# Patient Record
Sex: MALE | Race: WHITE | NOT HISPANIC OR LATINO | Employment: FULL TIME | URBAN - METROPOLITAN AREA
[De-identification: names, ages, dates, MRNs, and addresses within clinical notes are randomized per-mention and may not be internally consistent; named-entity substitution may affect disease eponyms.]

---

## 2024-03-12 ENCOUNTER — OFFICE VISIT (OUTPATIENT)
Dept: PRIMARY CARE | Facility: CLINIC | Age: 19
End: 2024-03-12
Payer: COMMERCIAL

## 2024-03-12 VITALS
HEIGHT: 68 IN | WEIGHT: 124 LBS | TEMPERATURE: 97.6 F | DIASTOLIC BLOOD PRESSURE: 72 MMHG | SYSTOLIC BLOOD PRESSURE: 110 MMHG | HEART RATE: 62 BPM | BODY MASS INDEX: 18.79 KG/M2 | OXYGEN SATURATION: 98 %

## 2024-03-12 DIAGNOSIS — K59.1 FUNCTIONAL DIARRHEA: ICD-10-CM

## 2024-03-12 DIAGNOSIS — Z00.00 ROUTINE GENERAL MEDICAL EXAMINATION AT A HEALTH CARE FACILITY: Primary | ICD-10-CM

## 2024-03-12 PROCEDURE — 1036F TOBACCO NON-USER: CPT | Performed by: INTERNAL MEDICINE

## 2024-03-12 PROCEDURE — 99385 PREV VISIT NEW AGE 18-39: CPT | Performed by: INTERNAL MEDICINE

## 2024-03-12 ASSESSMENT — PATIENT HEALTH QUESTIONNAIRE - PHQ9
SUM OF ALL RESPONSES TO PHQ9 QUESTIONS 1 AND 2: 0
1. LITTLE INTEREST OR PLEASURE IN DOING THINGS: NOT AT ALL
2. FEELING DOWN, DEPRESSED OR HOPELESS: NOT AT ALL

## 2024-03-12 ASSESSMENT — ENCOUNTER SYMPTOMS
DEPRESSION: 0
OCCASIONAL FEELINGS OF UNSTEADINESS: 0
LOSS OF SENSATION IN FEET: 0

## 2024-03-12 NOTE — PROGRESS NOTES
Subjective   Patient ID: Isauro Carroll is a 18 y.o. male who presents for Establish Care (New patient.  Establish care.  Has not been to a doctor in 3 years.  Digestive problems.  Stomach cramps and food goes right through him 15 minutes after eating.).  HPI  Patient is here for annual check-up    Last well check  new to doc     Reported health here for new to doc    Has been having  loose watery stool and occ solid w cramps and pain after meals 3 to 4 times a day'   No blood or mucus     Healthy eater    Has been small for what he eats     Dental  check  not in a long time no teeth issues      Vision check reg   Vision issues glasses   Hearing issues n   Vaccines UTD  y    Diet healthy  ats a lot does not gain weight   Exercise started gym   Work  in factory doing assembly   Caffeine  2 monster a week  Alcohol on occassion  Tobacco   Vaping until 2 weeks ago   Colon cancer screening  never     Current issues: see above      Review of Systems  GENERAL - Denies fever, fatigue or chills  SKIN - Denies rash, new skin lesions, or change in moles  EYES - Denies blurred vision, or change in visual acuity  EARS - Denies ear pain, discharge, ringing, or difficulty hearing  NOSE - Denies nasal congestion, discharge, or bleeding  MOUTH - Denies sore throat, postnasal drip or painful/difficulty swallowing  NECK - Denies pain or swelling  RESPIRATORY - Denies shortness of breath, cough, wheezing  CARDIOVASCULAR - Denies palpitations, chest pain, orthopnea, peripheral edema, syncope or claudication  GASTROINTESTINAL - Denies nausea, vomiting, diarrhea, constipation, abdominal pain, melena and or bright red blood  GENITOURINARY - Denies dysuria, frequency of urination, urgency, or hesitancy  MUSCULOSKELETAL - Denies joint or muscle pain, or back pain  NEUROLOGICAL - Denies localized numbness, weakness, or tingling  PSYCHIATRIC - Denies depression, anxiety, substance abuse, suicidal or homicidal ideation  ENDOCRINE - Denies heat  "or cold intolerance, weight loss or gain, increasing thirst  HEMATO-IMMUNOLOGIC - Denies easy bruising, bleeding, oral ulcerations or recurrent infections   Objective   /72   Pulse 62   Temp 36.4 °C (97.6 °F) (Oral)   Ht 1.727 m (5' 8\")   Wt 56.2 kg (124 lb)   SpO2 98%   BMI 18.85 kg/m²    Physical Exam  CONSTITUTIONAL - well nourished, well developed, looks like stated age, in no acute distress, not ill-appearing, and not tired appearing  SKIN - normal skin color and pigmentation, normal skin turgor without rash, lesions, or nodules visualized  HEAD - no trauma, normocephalic  EYES -normal bilaterally fundi normal extraocular movements are intact  ENT - TM's intact, no injection, no exudate,  nasal passage without discharge and patent  NECK - supple without rigidity, no neck mass was observed, no thyromegaly or thyroid nodules  CHEST - clear to auscultation, no wheezing, no crackles and no rales, good effort  CARDIAC - regular rate and regular rhythm, no skipped beats, no murmur  ABDOMEN - no organomegaly, soft, nontender, nondistended, normal bowel sounds, no guarding/rebound/rigidity  EXTREMITIES - no edema, no deformities  NEUROLOGICAL -cranial nerves II through XII are intact nonfocal  PSYCHIATRIC - alert, pleasant and cordial, age-appropriate  LYMPHATIC- no cervical lymphadenopathy    Assessment/Plan   Diagnoses and all orders for this visit:  Routine general medical examination at a health care facility  -     Comprehensive Metabolic Panel; Future  -     CBC and Auto Differential; Future  -     TSH with reflex to Free T4 if abnormal; Future  -     Lipid Panel; Future  Functional diarrhea  -     Amylase; Future  -     Lipase; Future  -     C-Reactive Protein; Future  -     Referral to Gastroenterology; Future    Blood work ordered  Referred to GI  Follow-up with me after  Discussed healthy diet and exercise  Discussed healthy meals avoiding excessive caffeine  Updated health maintenance " topics    Yesi Ca MD

## 2024-03-16 ENCOUNTER — LAB (OUTPATIENT)
Dept: LAB | Facility: LAB | Age: 19
End: 2024-03-16
Payer: COMMERCIAL

## 2024-03-16 DIAGNOSIS — K59.1 FUNCTIONAL DIARRHEA: ICD-10-CM

## 2024-03-16 DIAGNOSIS — Z00.00 ROUTINE GENERAL MEDICAL EXAMINATION AT A HEALTH CARE FACILITY: ICD-10-CM

## 2024-03-16 PROCEDURE — 80053 COMPREHEN METABOLIC PANEL: CPT

## 2024-03-16 PROCEDURE — 36415 COLL VENOUS BLD VENIPUNCTURE: CPT

## 2024-03-16 PROCEDURE — 83690 ASSAY OF LIPASE: CPT

## 2024-03-16 PROCEDURE — 80061 LIPID PANEL: CPT

## 2024-03-16 PROCEDURE — 82150 ASSAY OF AMYLASE: CPT

## 2024-03-16 PROCEDURE — 84443 ASSAY THYROID STIM HORMONE: CPT

## 2024-03-16 PROCEDURE — 85025 COMPLETE CBC W/AUTO DIFF WBC: CPT

## 2024-03-16 PROCEDURE — 86140 C-REACTIVE PROTEIN: CPT

## 2024-03-17 LAB
ALBUMIN SERPL BCP-MCNC: 4.5 G/DL (ref 3.4–5)
ALP SERPL-CCNC: 125 U/L (ref 33–120)
ALT SERPL W P-5'-P-CCNC: 24 U/L (ref 10–52)
AMYLASE SERPL-CCNC: 40 U/L (ref 29–103)
ANION GAP SERPL CALC-SCNC: 13 MMOL/L (ref 10–20)
AST SERPL W P-5'-P-CCNC: 25 U/L (ref 9–39)
BASOPHILS # BLD AUTO: 0.06 X10*3/UL (ref 0–0.1)
BASOPHILS NFR BLD AUTO: 1.2 %
BILIRUB SERPL-MCNC: 1.3 MG/DL (ref 0–1.2)
BUN SERPL-MCNC: 16 MG/DL (ref 6–23)
CALCIUM SERPL-MCNC: 9.5 MG/DL (ref 8.6–10.6)
CHLORIDE SERPL-SCNC: 103 MMOL/L (ref 98–107)
CHOLEST SERPL-MCNC: 149 MG/DL (ref 0–199)
CHOLESTEROL/HDL RATIO: 3.5
CO2 SERPL-SCNC: 27 MMOL/L (ref 21–32)
CREAT SERPL-MCNC: 0.99 MG/DL (ref 0.5–1.3)
CRP SERPL-MCNC: <0.1 MG/DL
EGFRCR SERPLBLD CKD-EPI 2021: >90 ML/MIN/1.73M*2
EOSINOPHIL # BLD AUTO: 0.06 X10*3/UL (ref 0–0.7)
EOSINOPHIL NFR BLD AUTO: 1.2 %
ERYTHROCYTE [DISTWIDTH] IN BLOOD BY AUTOMATED COUNT: 12.7 % (ref 11.5–14.5)
GLUCOSE SERPL-MCNC: 78 MG/DL (ref 74–99)
HCT VFR BLD AUTO: 41.7 % (ref 41–52)
HDLC SERPL-MCNC: 42.9 MG/DL
HGB BLD-MCNC: 14.9 G/DL (ref 13.5–17.5)
IMM GRANULOCYTES # BLD AUTO: 0.01 X10*3/UL (ref 0–0.7)
IMM GRANULOCYTES NFR BLD AUTO: 0.2 % (ref 0–0.9)
LDLC SERPL CALC-MCNC: 93 MG/DL
LIPASE SERPL-CCNC: 19 U/L (ref 9–82)
LYMPHOCYTES # BLD AUTO: 1.96 X10*3/UL (ref 1.2–4.8)
LYMPHOCYTES NFR BLD AUTO: 39.4 %
MCH RBC QN AUTO: 30.2 PG (ref 26–34)
MCHC RBC AUTO-ENTMCNC: 35.7 G/DL (ref 32–36)
MCV RBC AUTO: 84 FL (ref 80–100)
MONOCYTES # BLD AUTO: 0.43 X10*3/UL (ref 0.1–1)
MONOCYTES NFR BLD AUTO: 8.6 %
NEUTROPHILS # BLD AUTO: 2.46 X10*3/UL (ref 1.2–7.7)
NEUTROPHILS NFR BLD AUTO: 49.4 %
NON HDL CHOLESTEROL: 106 MG/DL (ref 0–119)
NRBC BLD-RTO: 0 /100 WBCS (ref 0–0)
PLATELET # BLD AUTO: 223 X10*3/UL (ref 150–450)
POTASSIUM SERPL-SCNC: 4 MMOL/L (ref 3.5–5.3)
PROT SERPL-MCNC: 6.6 G/DL (ref 6.4–8.2)
RBC # BLD AUTO: 4.94 X10*6/UL (ref 4.5–5.9)
SODIUM SERPL-SCNC: 139 MMOL/L (ref 136–145)
TRIGL SERPL-MCNC: 64 MG/DL (ref 0–149)
TSH SERPL-ACNC: 1.47 MIU/L (ref 0.44–3.98)
VLDL: 13 MG/DL (ref 0–40)
WBC # BLD AUTO: 5 X10*3/UL (ref 4.4–11.3)

## 2024-05-16 ENCOUNTER — HOSPITAL ENCOUNTER (OUTPATIENT)
Dept: RADIOLOGY | Facility: EXTERNAL LOCATION | Age: 19
Discharge: HOME | End: 2024-05-16
Payer: COMMERCIAL

## 2024-05-16 DIAGNOSIS — M25.512 ACUTE PAIN OF LEFT SHOULDER: ICD-10-CM

## 2024-10-08 ENCOUNTER — OFFICE VISIT (OUTPATIENT)
Dept: URGENT CARE | Age: 19
End: 2024-10-08
Payer: COMMERCIAL

## 2024-10-08 VITALS
RESPIRATION RATE: 20 BRPM | SYSTOLIC BLOOD PRESSURE: 134 MMHG | HEART RATE: 74 BPM | TEMPERATURE: 98 F | OXYGEN SATURATION: 98 % | DIASTOLIC BLOOD PRESSURE: 85 MMHG

## 2024-10-08 DIAGNOSIS — J02.9 SORE THROAT: ICD-10-CM

## 2024-10-08 DIAGNOSIS — J02.9 ACUTE PHARYNGITIS, UNSPECIFIED ETIOLOGY: Primary | ICD-10-CM

## 2024-10-08 LAB — POC RAPID STREP: NEGATIVE

## 2024-10-08 PROCEDURE — 87880 STREP A ASSAY W/OPTIC: CPT | Performed by: EMERGENCY MEDICINE

## 2024-10-08 PROCEDURE — 87651 STREP A DNA AMP PROBE: CPT

## 2024-10-08 PROCEDURE — 99213 OFFICE O/P EST LOW 20 MIN: CPT | Performed by: EMERGENCY MEDICINE

## 2024-10-08 ASSESSMENT — ENCOUNTER SYMPTOMS
SHORTNESS OF BREATH: 0
DYSURIA: 0
EYE REDNESS: 0
PALPITATIONS: 0
DIZZINESS: 0
ADENOPATHY: 0
BRUISES/BLEEDS EASILY: 0
CHEST TIGHTNESS: 0
SINUS PRESSURE: 0
NAUSEA: 0
FREQUENCY: 0
VOMITING: 0
EYE PAIN: 0
SINUS PAIN: 0
HEADACHES: 0
MYALGIAS: 0
FEVER: 1
ARTHRALGIAS: 0
CONSTIPATION: 0
SORE THROAT: 1
DIARRHEA: 0

## 2024-10-08 NOTE — PATIENT INSTRUCTIONS
Gargle with salt water three times a day for the next three days (please continue if you have started already).  You may find relief drinking warm liquids.  You may use over the counter medications for pain relief (Tylenol or ibuprofen per package instructions).  If you feel you are worsening (increased pain, not able to eat or drink, swelling in the back of the throat), please be re-evaluated (urgent care, family physician or emergency department

## 2024-10-08 NOTE — LETTER
October 8, 2024     Patient: Isauro Carroll   YOB: 2005   Date of Visit: 10/8/2024       To Whom It May Concern:    It is my medical opinion that Isauro Carroll may return to work on Thursday October 10, 2024 .    If you have any questions or concerns, please don't hesitate to call.         Sincerely,        Nena Contreras, DO    CC: No Recipients

## 2024-10-08 NOTE — PROGRESS NOTES
Subjective   Patient ID: Isauro Carroll is a 18 y.o. male. They present today with a chief complaint of Sore Throat and Fever (X 1 day).    History of Present Illness    Sore Throat   Pertinent negatives include no congestion, diarrhea, ear discharge, ear pain, headaches, shortness of breath or vomiting.   Fever   Associated symptoms include a sore throat. Pertinent negatives include no chest pain, congestion, diarrhea, ear pain, headaches, nausea, rash, urinary pain or vomiting.    18-year-old gentleman presents with a sore throat since yesterday.  States he also felt a bit febrile today while at work.  Mild headache.  Denies upper respiratory congestion.  Denies nausea vomiting or rash.  Denies chest pain or shortness of breath.  Occasional intermittent bilateral ear pain.  Concerned about strep.    Past Medical History  Allergies as of 10/08/2024    (No Known Allergies)       (Not in a hospital admission)       Past Medical History:   Diagnosis Date    Bitten or stung by nonvenomous insect and other nonvenomous arthropods, initial encounter 10/10/2016    Bug bite, initial encounter    Pneumonia, unspecified organism 11/12/2014    Atypical pneumonia    Tension-type headache, unspecified, not intractable 08/18/2014    Tension headache    Unspecified open wound, right lower leg, initial encounter     Wound of right leg       No past surgical history on file.     reports that he has quit smoking. His smoking use included cigarettes. He has never used smokeless tobacco. He reports current alcohol use. He reports that he does not use drugs.    Review of Systems  Review of Systems   Constitutional:  Positive for fever.   HENT:  Positive for sore throat. Negative for congestion, dental problem, ear discharge, ear pain, hearing loss, mouth sores, postnasal drip, sinus pressure, sinus pain and sneezing.    Eyes:  Negative for pain and redness.   Respiratory:  Negative for chest tightness and shortness of breath.     Cardiovascular:  Negative for chest pain and palpitations.   Gastrointestinal:  Negative for constipation, diarrhea, nausea and vomiting.   Endocrine: Negative for cold intolerance and heat intolerance.   Genitourinary:  Negative for dysuria and frequency.   Musculoskeletal:  Negative for arthralgias and myalgias.   Skin:  Negative for rash.   Neurological:  Negative for dizziness and headaches.   Hematological:  Negative for adenopathy. Does not bruise/bleed easily.                                  Objective    Vitals:    10/08/24 1737   BP: 134/85   BP Location: Right arm   Patient Position: Sitting   BP Cuff Size: Small adult   Pulse: 74   Resp: 20   Temp: 36.7 °C (98 °F)   TempSrc: Temporal   SpO2: 98%     No LMP for male patient.    Physical Exam  Vitals and nursing note reviewed.   Constitutional:       Appearance: Normal appearance.   HENT:      Head: Normocephalic and atraumatic.      Nose: Nose normal.      Mouth/Throat:      Mouth: Mucous membranes are moist.      Pharynx: Posterior oropharyngeal erythema present.      Comments: Palatal petechiae noted across the hard palate as well as behind the tonsillar region bilaterally.  Uvula midline.  Handling secretions.  Mucous membranes moist  Eyes:      Extraocular Movements: Extraocular movements intact.      Conjunctiva/sclera: Conjunctivae normal.      Pupils: Pupils are equal, round, and reactive to light.   Cardiovascular:      Rate and Rhythm: Normal rate and regular rhythm.   Pulmonary:      Effort: Pulmonary effort is normal.      Breath sounds: Normal breath sounds.   Skin:     General: Skin is warm and dry.   Neurological:      Mental Status: He is alert and oriented to person, place, and time.   Psychiatric:         Behavior: Behavior normal.         Procedures    Point of Care Test & Imaging Results from this visit  Results for orders placed or performed in visit on 10/08/24   POCT rapid strep A manually resulted   Result Value Ref Range    POC  Rapid Strep Negative Negative      No results found.    Diagnostic study results (if any) were reviewed by Nena Contreras DO.    Assessment/Plan   Allergies, medications, history, and pertinent labs/EKGs/Imaging reviewed by Nena Contreras DO.     Medical Decision Making  18-year-old male presents with sore throat and intermittent fever.  Rapid strep negative.  Culture sent due to high pretest probability.  Warm fluids salt water gargles Tylenol Motrin as needed until culture returns.  Treatment will be based upon culture result.  Work note given for tomorrow.  Follow-up with PCP    Orders and Diagnoses  Diagnoses and all orders for this visit:  Acute pharyngitis, unspecified etiology  -     Group A Streptococcus, PCR  Sore throat  -     POCT rapid strep A manually resulted      Medical Admin Record      Patient disposition: Home    Electronically signed by Nena Contreras DO  5:54 PM

## 2024-10-09 LAB — S PYO DNA THROAT QL NAA+PROBE: NOT DETECTED

## 2024-11-13 ENCOUNTER — OFFICE VISIT (OUTPATIENT)
Dept: URGENT CARE | Age: 19
End: 2024-11-13
Payer: COMMERCIAL

## 2024-11-13 VITALS
TEMPERATURE: 99.1 F | DIASTOLIC BLOOD PRESSURE: 85 MMHG | HEART RATE: 96 BPM | BODY MASS INDEX: 18.19 KG/M2 | HEIGHT: 68 IN | WEIGHT: 120 LBS | SYSTOLIC BLOOD PRESSURE: 121 MMHG | OXYGEN SATURATION: 97 % | RESPIRATION RATE: 16 BRPM

## 2024-11-13 DIAGNOSIS — J10.1 INFLUENZA A: Primary | ICD-10-CM

## 2024-11-13 DIAGNOSIS — Z20.822 CONTACT WITH AND (SUSPECTED) EXPOSURE TO COVID-19: ICD-10-CM

## 2024-11-13 LAB
POC CORONAVIRUS SARS-COV-2 PCR: NEGATIVE
POC HUMAN RHINOVIRUS PCR: NEGATIVE
POC INFLUENZA A VIRUS PCR: POSITIVE
POC INFLUENZA B VIRUS PCR: NEGATIVE
POC RESPIRATORY SYNCYTIAL VIRUS PCR: NEGATIVE

## 2024-11-13 RX ORDER — DICYCLOMINE HYDROCHLORIDE 20 MG/1
TABLET ORAL
COMMUNITY
Start: 2024-10-22

## 2024-11-13 RX ORDER — OSELTAMIVIR PHOSPHATE 75 MG/1
75 CAPSULE ORAL EVERY 12 HOURS
Qty: 10 CAPSULE | Refills: 0 | Status: SHIPPED | OUTPATIENT
Start: 2024-11-13 | End: 2024-11-18

## 2024-11-13 ASSESSMENT — ENCOUNTER SYMPTOMS
COUGH: 1
FEVER: 1
CHILLS: 1
EYE PAIN: 0
EYE DISCHARGE: 0
NAUSEA: 0
RHINORRHEA: 1
FATIGUE: 1
ARTHRALGIAS: 0
CHEST TIGHTNESS: 0
EYE ITCHING: 0
SORE THROAT: 0
SINUS PAIN: 0
VOMITING: 0
EYE REDNESS: 0
WHEEZING: 0
DIARRHEA: 0
JOINT SWELLING: 0
SHORTNESS OF BREATH: 0
ABDOMINAL PAIN: 0

## 2024-11-13 NOTE — PROGRESS NOTES
Subjective   Patient ID: Isauro Carroll is a 18 y.o. male. They present today with a chief complaint of Fever (X 3 days) and Cough (X 3 days).    History of Present Illness  Pt presented with Flu like symptoms started on Monday. Symptoms not significantly improved with OTC cold meds. Denies chronic condition. No SOB.       Fever   Associated symptoms include congestion and coughing. Pertinent negatives include no abdominal pain, chest pain, diarrhea, ear pain, nausea, rash, sore throat, vomiting or wheezing.   Cough  Associated symptoms include chills, a fever and rhinorrhea. Pertinent negatives include no chest pain, ear pain, eye redness, rash, sore throat, shortness of breath or wheezing.       Past Medical History  Allergies as of 11/13/2024    (No Known Allergies)       (Not in a hospital admission)       Past Medical History:   Diagnosis Date    Bitten or stung by nonvenomous insect and other nonvenomous arthropods, initial encounter 10/10/2016    Bug bite, initial encounter    Pneumonia, unspecified organism 11/12/2014    Atypical pneumonia    Tension-type headache, unspecified, not intractable 08/18/2014    Tension headache    Unspecified open wound, right lower leg, initial encounter     Wound of right leg       No past surgical history on file.     reports that he has quit smoking. His smoking use included cigarettes. He has never used smokeless tobacco. He reports current alcohol use. He reports that he does not use drugs.    Review of Systems  Review of Systems   Constitutional:  Positive for chills, fatigue and fever.   HENT:  Positive for congestion and rhinorrhea. Negative for ear discharge, ear pain, sinus pain, sneezing and sore throat.    Eyes:  Negative for pain, discharge, redness and itching.   Respiratory:  Positive for cough. Negative for chest tightness, shortness of breath and wheezing.    Cardiovascular:  Negative for chest pain.   Gastrointestinal:  Negative for abdominal pain, diarrhea,  "nausea and vomiting.   Musculoskeletal:  Negative for arthralgias and joint swelling.   Skin:  Negative for rash.                                  Objective    Vitals:    11/13/24 0934   BP: 121/85   Pulse: 96   Resp: 16   Temp: 37.3 °C (99.1 °F)   SpO2: 97%   Weight: 54.4 kg (120 lb)   Height: 1.727 m (5' 8\")     No LMP for male patient.    Physical Exam  Constitutional:       Appearance: Normal appearance.   HENT:      Head: Normocephalic and atraumatic.      Right Ear: Tympanic membrane, ear canal and external ear normal.      Left Ear: Tympanic membrane, ear canal and external ear normal.      Nose: No congestion or rhinorrhea.      Mouth/Throat:      Pharynx: No oropharyngeal exudate or posterior oropharyngeal erythema.   Cardiovascular:      Rate and Rhythm: Normal rate and regular rhythm.      Heart sounds: No murmur heard.  Pulmonary:      Effort: Pulmonary effort is normal. No respiratory distress.      Breath sounds: No stridor. No wheezing, rhonchi or rales.   Musculoskeletal:         General: No swelling or tenderness.   Skin:     General: Skin is warm and dry.      Findings: No bruising, erythema, lesion or rash.   Neurological:      Mental Status: He is alert and oriented to person, place, and time.   Psychiatric:         Mood and Affect: Mood normal.         Procedures    Point of Care Test & Imaging Results from this visit  Results for orders placed or performed in visit on 11/13/24   POCT SPOTFIRE R/ST Panel Mini w/COVID ([facility]) manually resulted    Specimen: Swab   Result Value Ref Range    POC Sars-Cov-2 PCR Negative Negative    POC Respiratory Syncytial Virus PCR Negative Negative    POC Influenza A Virus PCR Positive (A) Negative    POC Influenza B Virus PCR Negative Negative    POC Human Rhinovirus PCR Negative Negative      No results found.    Diagnostic study results (if any) were reviewed by Анна Garza PA-C.    Assessment/Plan   Allergies, medications, history, and pertinent " labs/EKGs/Imaging reviewed by Анна Garza PA-C.     Medical Decision Making  Tested positive for Flu  Still within 48hours treatment window and will initiate tx  No signs of medical emergency    Orders and Diagnoses  Diagnoses and all orders for this visit:  Influenza A  -     oseltamivir (Tamiflu) 75 mg capsule; Take 1 capsule (75 mg) by mouth every 12 hours for 5 days.  Contact with and (suspected) exposure to covid-19  -     POCT SPOTFIRE R/ST Panel Mini w/COVID ([facility]) manually resulted      Medical Admin Record      Patient disposition: Home    Electronically signed by Анна Garza PA-C  10:17 AM

## 2024-11-13 NOTE — LETTER
November 13, 2024     Patient: Isauro Carroll   YOB: 2005   Date of Visit: 11/13/2024       To Whom It May Concern:    It is my medical opinion that Isauro Carroll should remain out of work until 11/17/24 and return 11/18/24 .    If you have any questions or concerns, please don't hesitate to call.         Sincerely,        Анна Garza PA-C

## 2024-11-30 ENCOUNTER — LAB (OUTPATIENT)
Dept: LAB | Facility: LAB | Age: 19
End: 2024-11-30
Payer: COMMERCIAL

## 2024-11-30 DIAGNOSIS — R10.30 LOWER ABDOMINAL PAIN, UNSPECIFIED: Primary | ICD-10-CM

## 2024-11-30 DIAGNOSIS — R19.7 DIARRHEA, UNSPECIFIED: ICD-10-CM

## 2024-11-30 PROCEDURE — 83516 IMMUNOASSAY NONANTIBODY: CPT

## 2024-11-30 PROCEDURE — 86231 EMA EACH IG CLASS: CPT

## 2024-11-30 PROCEDURE — 36415 COLL VENOUS BLD VENIPUNCTURE: CPT

## 2024-11-30 PROCEDURE — 82784 ASSAY IGA/IGD/IGG/IGM EACH: CPT

## 2024-12-01 LAB
IGG SERPL-MCNC: 890 MG/DL (ref 700–1600)
TTG IGA SER IA-ACNC: <1 U/ML

## 2024-12-02 LAB — GLIADIN PEPTIDE IGG SER IA-ACNC: <0.56 FLU (ref 0–4.99)

## 2024-12-03 LAB — ENDOMYSIUM IGA TITR SER IF: NORMAL {TITER}

## 2025-04-20 ENCOUNTER — OFFICE VISIT (OUTPATIENT)
Dept: URGENT CARE | Age: 20
End: 2025-04-20
Payer: COMMERCIAL

## 2025-04-20 VITALS
BODY MASS INDEX: 19.7 KG/M2 | OXYGEN SATURATION: 98 % | SYSTOLIC BLOOD PRESSURE: 131 MMHG | RESPIRATION RATE: 18 BRPM | DIASTOLIC BLOOD PRESSURE: 84 MMHG | WEIGHT: 130 LBS | HEIGHT: 68 IN | TEMPERATURE: 97.4 F | HEART RATE: 71 BPM

## 2025-04-20 DIAGNOSIS — H02.841 SWELLING OF RIGHT UPPER EYELID: ICD-10-CM

## 2025-04-20 DIAGNOSIS — S05.01XA ABRASION OF RIGHT CORNEA, INITIAL ENCOUNTER: Primary | ICD-10-CM

## 2025-04-20 DIAGNOSIS — H10.021 MUCOPURULENT CONJUNCTIVITIS OF RIGHT EYE: ICD-10-CM

## 2025-04-20 PROCEDURE — 1036F TOBACCO NON-USER: CPT | Performed by: PHYSICIAN ASSISTANT

## 2025-04-20 PROCEDURE — 99214 OFFICE O/P EST MOD 30 MIN: CPT | Performed by: PHYSICIAN ASSISTANT

## 2025-04-20 PROCEDURE — 99070 SPECIAL SUPPLIES PHYS/QHP: CPT | Performed by: PHYSICIAN ASSISTANT

## 2025-04-20 PROCEDURE — 3008F BODY MASS INDEX DOCD: CPT | Performed by: PHYSICIAN ASSISTANT

## 2025-04-20 RX ORDER — CEFDINIR 300 MG/1
300 CAPSULE ORAL 2 TIMES DAILY
Qty: 20 CAPSULE | Refills: 0 | Status: SHIPPED | OUTPATIENT
Start: 2025-04-20 | End: 2025-04-30

## 2025-04-20 RX ORDER — TOBRAMYCIN 3 MG/ML
2 SOLUTION/ DROPS OPHTHALMIC EVERY 4 HOURS
Qty: 5 ML | Refills: 0 | Status: SHIPPED | OUTPATIENT
Start: 2025-04-20 | End: 2025-04-27

## 2025-04-20 ASSESSMENT — PAIN SCALES - GENERAL: PAINLEVEL_OUTOF10: 4

## 2025-04-20 NOTE — PATIENT INSTRUCTIONS
Please take medications as prescribed. You can stop the oral antibiotics after 7 days if symptoms have resolved completely.   Please follow up with eye doctor of your choice over the next few days   ED visit if worsening in any way

## 2025-04-20 NOTE — PROGRESS NOTES
"Subjective   Patient ID: Isauro Carroll is a 19 y.o. male. They present today with a chief complaint of Eye Problem (RT eye can not open. ).    History of Present Illness  Isauro is a 19 year old male no pmhx presents to  w R eyelid swelling x 1 day. Initially started yesterday was some irritation to the eye, reports he was drinking last was scratching at eye, this morning woke up with swelling to R eye lid, eyelid crusted shut and redness to R eye. No fevers. No ocular pain or visual changes.       Eye Problem      Past Medical History  Allergies as of 04/20/2025    (No Known Allergies)       Prescriptions Prior to Admission[1]     Medical History[2]    Surgical History[3]     reports that he has quit smoking. His smoking use included cigarettes. He has never used smokeless tobacco. He reports current alcohol use. He reports that he does not use drugs.    Review of Systems  Review of Systems                               Objective    Vitals:    04/20/25 1407   BP: 131/84   BP Location: Right arm   Patient Position: Sitting   BP Cuff Size: Adult   Pulse: 71   Resp: 18   Temp: 36.3 °C (97.4 °F)   TempSrc: Temporal   SpO2: 98%   Weight: 59 kg (130 lb)   Height: 1.727 m (5' 8\")     No LMP for male patient.    Physical Exam  Constitutional:       Appearance: Normal appearance.   HENT:      Head: Normocephalic and atraumatic.   Eyes:      General: Lids are everted, no foreign bodies appreciated.         Right eye: Discharge present.      Extraocular Movements: Extraocular movements intact.      Right eye: Normal extraocular motion.      Conjunctiva/sclera:      Right eye: Right conjunctiva is injected. Hemorrhage present.      Comments: Small corneal abrasion 3pm position    Pulmonary:      Effort: Pulmonary effort is normal.   Musculoskeletal:      Cervical back: Normal range of motion and neck supple.   Lymphadenopathy:      Cervical: No cervical adenopathy.   Skin:     General: Skin is warm and dry.   Neurological: "      Mental Status: He is alert and oriented to person, place, and time.         Procedures    Point of Care Test & Imaging Results from this visit  No results found for this visit on 04/20/25.   Imaging  No results found.    Cardiology, Vascular, and Other Imaging  No other imaging results found for the past 2 days      Diagnostic study results (if any) were reviewed by Cristina Chin PA-C.    Assessment/Plan   Allergies, medications, history, and pertinent labs/EKGs/Imaging reviewed by Cristina Chin PA-C.     Medical Decision Making  Isauro presents with R eyelid swelling, eye redness crusting and drainage consistent with a mucopurulent conjunctivitis. Moderate eyelid edema with copious yellow drainage from medial canthus of eye. Corneal abrasion on exam, likely secondary to itching yesterday. Will start tobrex, cover with cefdinir due to degree of edema. Continue lid hygiene and close optometry follow up. ED if worsening.   Plan of care discussed with patient and/or family who verbalized understanding. Recommend Follow up with PCP. Advised seeking immediate emergency medical attention if symptoms fail to improve, worsen or any concerning symptoms arise. Patient/Guardian voiced full understanding and agreement to plan.      Orders and Diagnoses  Diagnoses and all orders for this visit:  Abrasion of right cornea, initial encounter  -     tobramycin (Tobrex) 0.3 % ophthalmic solution; Administer 2 drops into the right eye every 4 hours for 7 days.  Mucopurulent conjunctivitis of right eye  -     cefdinir (Omnicef) 300 mg capsule; Take 1 capsule (300 mg) by mouth 2 times a day for 10 days. Stop after 7 days if symptoms have resolved completely.  Swelling of right upper eyelid      Medical Admin Record      Patient disposition: Home    Electronically signed by Cristina Chin PA-C  2:42 PM           [1] (Not in a hospital admission)   [2]   Past Medical History:  Diagnosis Date    Bitten or stung by nonvenomous  insect and other nonvenomous arthropods, initial encounter 10/10/2016    Bug bite, initial encounter    Pneumonia, unspecified organism 11/12/2014    Atypical pneumonia    Tension-type headache, unspecified, not intractable 08/18/2014    Tension headache    Unspecified open wound, right lower leg, initial encounter     Wound of right leg   [3] No past surgical history on file.